# Patient Record
Sex: FEMALE | Race: WHITE
[De-identification: names, ages, dates, MRNs, and addresses within clinical notes are randomized per-mention and may not be internally consistent; named-entity substitution may affect disease eponyms.]

---

## 2018-04-03 ENCOUNTER — HOSPITAL ENCOUNTER (OUTPATIENT)
Dept: HOSPITAL 53 - M SMT | Age: 48
End: 2018-04-03
Attending: PHYSICIAN ASSISTANT
Payer: COMMERCIAL

## 2018-04-03 DIAGNOSIS — R60.0: Primary | ICD-10-CM

## 2018-04-03 DIAGNOSIS — N18.3: ICD-10-CM

## 2018-04-03 DIAGNOSIS — M10.072: ICD-10-CM

## 2018-04-03 LAB
ALBUMIN/GLOBULIN RATIO: 0.95 (ref 1–1.93)
ALBUMIN: 3.6 GM/DL (ref 3.2–5.2)
ALKALINE PHOSPHATASE: 113 U/L (ref 45–117)
ALT SERPL W P-5'-P-CCNC: 41 U/L (ref 12–78)
ANION GAP: 5 MEQ/L (ref 8–16)
AST SERPL-CCNC: 28 U/L (ref 7–37)
BILIRUBIN,TOTAL: 0.2 MG/DL (ref 0.2–1)
BLOOD UREA NITROGEN: 11 MG/DL (ref 7–18)
CALCIUM LEVEL: 8.6 MG/DL (ref 8.5–10.1)
CARBON DIOXIDE LEVEL: 30 MEQ/L (ref 21–32)
CHLORIDE LEVEL: 107 MEQ/L (ref 98–107)
CREATININE FOR GFR: 1.16 MG/DL (ref 0.55–1.3)
GFR SERPL CREATININE-BSD FRML MDRD: 53.3 ML/MIN/{1.73_M2} (ref 58–?)
GLUCOSE, FASTING: 85 MG/DL (ref 70–100)
NT-PRO BNP: 234 PG/ML (ref ?–125)
POTASSIUM SERUM: 4.2 MEQ/L (ref 3.5–5.1)
SODIUM LEVEL: 142 MEQ/L (ref 136–145)
TOTAL PROTEIN: 7.4 GM/DL (ref 6.4–8.2)

## 2018-04-03 PROCEDURE — 84550 ASSAY OF BLOOD/URIC ACID: CPT

## 2018-04-04 LAB — URIC ACID: 7.7 MG/DL (ref 2.6–6)

## 2018-04-12 ENCOUNTER — HOSPITAL ENCOUNTER (OUTPATIENT)
Dept: HOSPITAL 53 - M LAB REF | Age: 48
End: 2018-04-12
Attending: INTERNAL MEDICINE
Payer: COMMERCIAL

## 2018-04-12 DIAGNOSIS — D63.1: ICD-10-CM

## 2018-04-12 DIAGNOSIS — N18.3: Primary | ICD-10-CM

## 2018-04-12 PROCEDURE — 83550 IRON BINDING TEST: CPT

## 2018-04-13 LAB
FERRITIN: 26 NG/ML (ref 8–252)
IRON (FE): 52 UG/DL (ref 50–170)
IRON SATN MFR SERPL: 14.1 % (ref 13.2–45)
TOTAL IRON BINDING CAPACITY: 369 UG/DL (ref 250–450)

## 2018-04-19 ENCOUNTER — HOSPITAL ENCOUNTER (OUTPATIENT)
Dept: HOSPITAL 53 - M RAD | Age: 48
End: 2018-04-19
Attending: INTERNAL MEDICINE
Payer: COMMERCIAL

## 2018-04-19 DIAGNOSIS — R93.422: ICD-10-CM

## 2018-04-19 DIAGNOSIS — I10: Primary | ICD-10-CM

## 2018-04-19 DIAGNOSIS — R93.421: ICD-10-CM

## 2018-04-19 DIAGNOSIS — N18.3: Primary | ICD-10-CM

## 2018-04-19 PROCEDURE — 71046 X-RAY EXAM CHEST 2 VIEWS: CPT

## 2018-04-19 PROCEDURE — 76775 US EXAM ABDO BACK WALL LIM: CPT

## 2018-07-18 ENCOUNTER — HOSPITAL ENCOUNTER (OUTPATIENT)
Dept: HOSPITAL 53 - M SMT | Age: 48
End: 2018-07-18
Attending: FAMILY MEDICINE
Payer: COMMERCIAL

## 2018-07-18 DIAGNOSIS — N18.3: Primary | ICD-10-CM

## 2018-07-18 LAB
ALBUMIN/GLOBULIN RATIO: 1 (ref 1–1.93)
ALBUMIN: 3.6 GM/DL (ref 3.2–5.2)
ALKALINE PHOSPHATASE: 113 U/L (ref 45–117)
ALT SERPL W P-5'-P-CCNC: 44 U/L (ref 12–78)
ANION GAP: 8 MEQ/L (ref 8–16)
AST SERPL-CCNC: 29 U/L (ref 7–37)
BILIRUBIN,TOTAL: 0.3 MG/DL (ref 0.2–1)
BLOOD UREA NITROGEN: 21 MG/DL (ref 7–18)
CALCIUM LEVEL: 8.8 MG/DL (ref 8.5–10.1)
CARBON DIOXIDE LEVEL: 27 MEQ/L (ref 21–32)
CHLORIDE LEVEL: 107 MEQ/L (ref 98–107)
CREATININE FOR GFR: 1.37 MG/DL (ref 0.55–1.3)
EST. AVERAGE GLUCOSE BLD GHB EST-MCNC: 146 MG/DL (ref 60–110)
GFR SERPL CREATININE-BSD FRML MDRD: 44 ML/MIN/{1.73_M2} (ref 58–?)
GLUCOSE, FASTING: 102 MG/DL (ref 70–100)
POTASSIUM SERUM: 5.2 MEQ/L (ref 3.5–5.1)
SODIUM LEVEL: 142 MEQ/L (ref 136–145)
TOTAL PROTEIN: 7.2 GM/DL (ref 6.4–8.2)

## 2018-10-09 ENCOUNTER — HOSPITAL ENCOUNTER (OUTPATIENT)
Dept: HOSPITAL 53 - M SMT | Age: 48
End: 2018-10-09
Attending: FAMILY MEDICINE
Payer: COMMERCIAL

## 2018-10-09 DIAGNOSIS — E11.22: ICD-10-CM

## 2018-10-09 DIAGNOSIS — I12.9: Primary | ICD-10-CM

## 2018-10-09 LAB
ALBUMIN/GLOBULIN RATIO: 1 (ref 1–1.93)
ALBUMIN: 3.8 GM/DL (ref 3.2–5.2)
ALKALINE PHOSPHATASE: 122 U/L (ref 45–117)
ALT SERPL W P-5'-P-CCNC: 41 U/L (ref 12–78)
ANION GAP: 6 MEQ/L (ref 8–16)
AST SERPL-CCNC: 27 U/L (ref 7–37)
BILIRUBIN,TOTAL: 0.4 MG/DL (ref 0.2–1)
BLOOD UREA NITROGEN: 16 MG/DL (ref 7–18)
CALCIUM LEVEL: 8.9 MG/DL (ref 8.5–10.1)
CARBON DIOXIDE LEVEL: 29 MEQ/L (ref 21–32)
CHLORIDE LEVEL: 103 MEQ/L (ref 98–107)
CREATININE FOR GFR: 1.35 MG/DL (ref 0.55–1.3)
EST. AVERAGE GLUCOSE BLD GHB EST-MCNC: 137 MG/DL (ref 60–110)
GFR SERPL CREATININE-BSD FRML MDRD: 44.6 ML/MIN/{1.73_M2} (ref 58–?)
GLUCOSE, FASTING: 89 MG/DL (ref 70–100)
POTASSIUM SERUM: 5 MEQ/L (ref 3.5–5.1)
SODIUM LEVEL: 138 MEQ/L (ref 136–145)
TOTAL PROTEIN: 7.6 GM/DL (ref 6.4–8.2)

## 2018-10-09 PROCEDURE — 80053 COMPREHEN METABOLIC PANEL: CPT

## 2019-01-16 ENCOUNTER — HOSPITAL ENCOUNTER (OUTPATIENT)
Dept: HOSPITAL 53 - M SMT | Age: 49
End: 2019-01-16
Attending: PHYSICIAN ASSISTANT
Payer: COMMERCIAL

## 2019-01-16 DIAGNOSIS — Z79.899: ICD-10-CM

## 2019-01-16 DIAGNOSIS — E11.22: ICD-10-CM

## 2019-01-16 DIAGNOSIS — E55.9: Primary | ICD-10-CM

## 2019-01-16 LAB
25(OH)D3 SERPL-MCNC: 54.7 NG/ML (ref 30–100)
BUN SERPL-MCNC: 17 MG/DL (ref 7–18)
CALCIUM SERPL-MCNC: 9.3 MG/DL (ref 8.5–10.1)
CHLORIDE SERPL-SCNC: 103 MEQ/L (ref 98–107)
CO2 SERPL-SCNC: 28 MEQ/L (ref 21–32)
CREAT SERPL-MCNC: 1.46 MG/DL (ref 0.55–1.3)
EST. AVERAGE GLUCOSE BLD GHB EST-MCNC: 134 MG/DL (ref 60–110)
GFR SERPL CREATININE-BSD FRML MDRD: 40.7 ML/MIN/{1.73_M2} (ref 58–?)
GLUCOSE SERPL-MCNC: 86 MG/DL (ref 70–100)
POTASSIUM SERPL-SCNC: 4.8 MEQ/L (ref 3.5–5.1)
SODIUM SERPL-SCNC: 138 MEQ/L (ref 136–145)

## 2019-04-16 ENCOUNTER — HOSPITAL ENCOUNTER (OUTPATIENT)
Dept: HOSPITAL 53 - M SMT | Age: 49
End: 2019-04-16
Attending: FAMILY MEDICINE
Payer: COMMERCIAL

## 2019-04-16 DIAGNOSIS — E11.22: ICD-10-CM

## 2019-04-16 DIAGNOSIS — I12.9: Primary | ICD-10-CM

## 2019-04-16 LAB
ALBUMIN SERPL BCG-MCNC: 4 GM/DL (ref 3.2–5.2)
ALT SERPL W P-5'-P-CCNC: 28 U/L (ref 12–78)
BASOPHILS # BLD AUTO: 0 10^3/UL (ref 0–0.2)
BASOPHILS NFR BLD AUTO: 0.3 % (ref 0–1)
BILIRUB SERPL-MCNC: 0.3 MG/DL (ref 0.2–1)
BUN SERPL-MCNC: 15 MG/DL (ref 7–18)
CALCIUM SERPL-MCNC: 9.2 MG/DL (ref 8.5–10.1)
CHLORIDE SERPL-SCNC: 104 MEQ/L (ref 98–107)
CO2 SERPL-SCNC: 29 MEQ/L (ref 21–32)
CREAT SERPL-MCNC: 1.27 MG/DL (ref 0.55–1.3)
EOSINOPHIL # BLD AUTO: 0.1 10^3/UL (ref 0–0.5)
EOSINOPHIL NFR BLD AUTO: 1.3 % (ref 0–3)
EST. AVERAGE GLUCOSE BLD GHB EST-MCNC: 123 MG/DL (ref 60–110)
GFR SERPL CREATININE-BSD FRML MDRD: 47.8 ML/MIN/{1.73_M2} (ref 58–?)
GLUCOSE SERPL-MCNC: 95 MG/DL (ref 70–100)
HCT VFR BLD AUTO: 38.7 % (ref 36–47)
HGB BLD-MCNC: 12.3 G/DL (ref 12–15.5)
LYMPHOCYTES # BLD AUTO: 1.6 10^3/UL (ref 1.5–4.5)
LYMPHOCYTES NFR BLD AUTO: 20.1 % (ref 24–44)
MCH RBC QN AUTO: 28.3 PG (ref 27–33)
MCHC RBC AUTO-ENTMCNC: 31.8 G/DL (ref 32–36.5)
MCV RBC AUTO: 89.2 FL (ref 80–96)
MONOCYTES # BLD AUTO: 0.6 10^3/UL (ref 0–0.8)
MONOCYTES NFR BLD AUTO: 7 % (ref 0–5)
NEUTROPHILS # BLD AUTO: 5.6 10^3/UL (ref 1.8–7.7)
NEUTROPHILS NFR BLD AUTO: 70.8 % (ref 36–66)
PLATELET # BLD AUTO: 320 10^3/UL (ref 150–450)
POTASSIUM SERPL-SCNC: 4.3 MEQ/L (ref 3.5–5.1)
PROT SERPL-MCNC: 7.1 GM/DL (ref 6.4–8.2)
RBC # BLD AUTO: 4.34 10^6/UL (ref 4–5.4)
SODIUM SERPL-SCNC: 139 MEQ/L (ref 136–145)
WBC # BLD AUTO: 7.8 10^3/UL (ref 4–10)

## 2019-07-12 ENCOUNTER — HOSPITAL ENCOUNTER (OUTPATIENT)
Dept: HOSPITAL 53 - M SMT | Age: 49
End: 2019-07-12
Attending: PHYSICIAN ASSISTANT
Payer: COMMERCIAL

## 2019-07-12 DIAGNOSIS — E11.22: Primary | ICD-10-CM

## 2019-07-12 LAB
BUN SERPL-MCNC: 20 MG/DL (ref 7–18)
CALCIUM SERPL-MCNC: 8.3 MG/DL (ref 8.5–10.1)
CHLORIDE SERPL-SCNC: 108 MEQ/L (ref 98–107)
CO2 SERPL-SCNC: 25 MEQ/L (ref 21–32)
CREAT SERPL-MCNC: 1.21 MG/DL (ref 0.55–1.3)
EST. AVERAGE GLUCOSE BLD GHB EST-MCNC: 146 MG/DL (ref 60–110)
GFR SERPL CREATININE-BSD FRML MDRD: 50.6 ML/MIN/{1.73_M2} (ref 58–?)
GLUCOSE SERPL-MCNC: 79 MG/DL (ref 70–100)
POTASSIUM SERPL-SCNC: 4.2 MEQ/L (ref 3.5–5.1)
SODIUM SERPL-SCNC: 141 MEQ/L (ref 136–145)

## 2020-01-24 ENCOUNTER — HOSPITAL ENCOUNTER (OUTPATIENT)
Dept: HOSPITAL 53 - M LABDRAW1 | Age: 50
End: 2020-01-24
Attending: FAMILY MEDICINE
Payer: COMMERCIAL

## 2020-01-24 DIAGNOSIS — E11.22: Primary | ICD-10-CM

## 2020-01-24 LAB
ALBUMIN SERPL BCG-MCNC: 3.9 GM/DL (ref 3.2–5.2)
ALT SERPL W P-5'-P-CCNC: 20 U/L (ref 12–78)
BILIRUB SERPL-MCNC: 0.6 MG/DL (ref 0.2–1)
BUN SERPL-MCNC: 13 MG/DL (ref 7–18)
CALCIUM SERPL-MCNC: 9.1 MG/DL (ref 8.5–10.1)
CHLORIDE SERPL-SCNC: 101 MEQ/L (ref 98–107)
CO2 SERPL-SCNC: 32 MEQ/L (ref 21–32)
CREAT SERPL-MCNC: 1.4 MG/DL (ref 0.55–1.3)
EST. AVERAGE GLUCOSE BLD GHB EST-MCNC: 117 MG/DL (ref 60–110)
GFR SERPL CREATININE-BSD FRML MDRD: 42.5 ML/MIN/{1.73_M2} (ref 58–?)
GLUCOSE SERPL-MCNC: 84 MG/DL (ref 70–100)
POTASSIUM SERPL-SCNC: 4.4 MEQ/L (ref 3.5–5.1)
PROT SERPL-MCNC: 7.5 GM/DL (ref 6.4–8.2)
SODIUM SERPL-SCNC: 140 MEQ/L (ref 136–145)

## 2020-07-23 ENCOUNTER — HOSPITAL ENCOUNTER (OUTPATIENT)
Dept: HOSPITAL 53 - M LRY | Age: 50
End: 2020-07-23
Attending: PHYSICIAN ASSISTANT
Payer: COMMERCIAL

## 2020-07-23 DIAGNOSIS — E11.22: Primary | ICD-10-CM

## 2020-07-23 DIAGNOSIS — E55.9: ICD-10-CM

## 2020-10-16 ENCOUNTER — HOSPITAL ENCOUNTER (OUTPATIENT)
Dept: HOSPITAL 53 - M LAB REF | Age: 50
End: 2020-10-16
Attending: FAMILY MEDICINE
Payer: COMMERCIAL

## 2020-10-16 DIAGNOSIS — I12.9: Primary | ICD-10-CM

## 2020-10-16 LAB
BASOPHILS # BLD AUTO: 0 10^3/UL (ref 0–0.2)
BASOPHILS NFR BLD AUTO: 0.4 % (ref 0–1)
BUN SERPL-MCNC: 15 MG/DL (ref 7–18)
CALCIUM SERPL-MCNC: 9.2 MG/DL (ref 8.5–10.1)
CHLORIDE SERPL-SCNC: 104 MEQ/L (ref 98–107)
CO2 SERPL-SCNC: 31 MEQ/L (ref 21–32)
CREAT SERPL-MCNC: 1.32 MG/DL (ref 0.55–1.3)
EOSINOPHIL # BLD AUTO: 0.1 10^3/UL (ref 0–0.5)
EOSINOPHIL NFR BLD AUTO: 1.2 % (ref 0–3)
EST. AVERAGE GLUCOSE BLD GHB EST-MCNC: 120 MG/DL (ref 60–110)
GFR SERPL CREATININE-BSD FRML MDRD: 45.4 ML/MIN/{1.73_M2} (ref 51–?)
GLUCOSE SERPL-MCNC: 83 MG/DL (ref 70–100)
HCT VFR BLD AUTO: 39.7 % (ref 36–47)
HGB BLD-MCNC: 12.6 G/DL (ref 12–15.5)
LYMPHOCYTES # BLD AUTO: 1.8 10^3/UL (ref 1.5–5)
LYMPHOCYTES NFR BLD AUTO: 23 % (ref 24–44)
MCH RBC QN AUTO: 28.2 PG (ref 27–33)
MCHC RBC AUTO-ENTMCNC: 31.7 G/DL (ref 32–36.5)
MCV RBC AUTO: 88.8 FL (ref 80–96)
MONOCYTES # BLD AUTO: 0.6 10^3/UL (ref 0–0.8)
MONOCYTES NFR BLD AUTO: 8 % (ref 0–5)
NEUTROPHILS # BLD AUTO: 5.2 10^3/UL (ref 1.5–8.5)
NEUTROPHILS NFR BLD AUTO: 67 % (ref 36–66)
PLATELET # BLD AUTO: 296 10^3/UL (ref 150–450)
POTASSIUM SERPL-SCNC: 4.6 MEQ/L (ref 3.5–5.1)
RBC # BLD AUTO: 4.47 10^6/UL (ref 4–5.4)
SODIUM SERPL-SCNC: 138 MEQ/L (ref 136–145)
WBC # BLD AUTO: 7.7 10^3/UL (ref 4–10)

## 2020-10-28 ENCOUNTER — HOSPITAL ENCOUNTER (OUTPATIENT)
Dept: HOSPITAL 53 - M RAD | Age: 50
End: 2020-10-28
Attending: INTERNAL MEDICINE
Payer: COMMERCIAL

## 2020-10-28 DIAGNOSIS — M18.32: Primary | ICD-10-CM

## 2020-10-29 NOTE — REP
INDICATION:

CKD III



COMPARISON:

12/23/2019



TECHNIQUE:

Real time gray scale ultrasound examination using curved array transducer.



FINDINGS:

The bilateral kidneys are relatively normal in reniform shape and demonstrate

increased central sinus fat along with echogenic intrarenal vasculature consistent

with age related medical renal disease.



Right kidney measures 9.8 x 4.2 x 4.1 cm without hydronephrosis, cystic or renal mass

lesion.



Left kidney measures 11.1 x 3.9 x 4.7 cm without hydronephrosis, cystic or renal mass

lesion.



Bladder is under unremarkable.



IMPRESSION:

1. Findings consistent with chronic medical renal disease as described above.







<Electronically signed by Arya Sahni > 10/29/20 0946

## 2020-12-04 ENCOUNTER — HOSPITAL ENCOUNTER (OUTPATIENT)
Dept: HOSPITAL 53 - M LABSMTC | Age: 50
End: 2020-12-04
Attending: ANESTHESIOLOGY
Payer: COMMERCIAL

## 2020-12-04 DIAGNOSIS — Z01.812: Primary | ICD-10-CM

## 2020-12-04 DIAGNOSIS — Z20.828: ICD-10-CM

## 2020-12-09 ENCOUNTER — HOSPITAL ENCOUNTER (OUTPATIENT)
Dept: HOSPITAL 53 - M OPP | Age: 50
Discharge: HOME | End: 2020-12-09
Attending: SURGERY
Payer: COMMERCIAL

## 2020-12-09 VITALS — WEIGHT: 218 LBS | HEIGHT: 66 IN | BODY MASS INDEX: 35.03 KG/M2

## 2020-12-09 VITALS — DIASTOLIC BLOOD PRESSURE: 76 MMHG | SYSTOLIC BLOOD PRESSURE: 125 MMHG

## 2020-12-09 DIAGNOSIS — I10: ICD-10-CM

## 2020-12-09 DIAGNOSIS — Z79.899: ICD-10-CM

## 2020-12-09 DIAGNOSIS — K64.0: ICD-10-CM

## 2020-12-09 DIAGNOSIS — Z79.84: ICD-10-CM

## 2020-12-09 DIAGNOSIS — G47.30: ICD-10-CM

## 2020-12-09 DIAGNOSIS — E11.9: ICD-10-CM

## 2020-12-09 DIAGNOSIS — Z12.11: Primary | ICD-10-CM

## 2020-12-09 DIAGNOSIS — Z88.1: ICD-10-CM

## 2020-12-09 DIAGNOSIS — K63.89: ICD-10-CM

## 2020-12-09 NOTE — ROOR
________________________________________________________________________________

Patient Name: Steven Day                Procedure Date: 12/9/2020 8:47 AM

MRN: [id number]                          Account Number: [id number]

YOB: 1970               Age: 50

Room: [facility]                            Gender: Female

Note Status: Finalized                 

________________________________________________________________________________

 

Procedure:            Colonoscopy

Indications:          Screening for colorectal malignant neoplasm

Providers:            Pepe Matta DO

Referring MD:         Ifeoma ORTIZ DO

Requesting Provider:  

Medicines:            Propofol per Anesthesia

Complications:        No immediate complications.

________________________________________________________________________________

Procedure:            Pre-Anesthesia Assessment:

                      - Prior to the procedure, a History and Physical was 

                      performed, and patient medications and allergies were 

                      reviewed. The patient is competent. The risks and 

                      benefits of the procedure and the sedation options and 

                      risks were discussed with the patient. All questions 

                      were answered and informed consent was obtained. Patient 

                      identification and proposed procedure were verified by 

                      the physician, the nurse, the anesthetist and the 

                      technician in the endoscopy suite. Mental Status 

                      Examination: alert and oriented. Airway Examination: 

                      normal oropharyngeal airway and neck mobility. 

                      Respiratory Examination: clear to auscultation. CV 

                      Examination: normal. Prophylactic Antibiotics: The 

                      patient does not require prophylactic antibiotics. Prior 

                      Anticoagulants: The patient has taken no previous 

                      anticoagulant or antiplatelet agents. ASA Grade 

                      Assessment: II - A patient with mild systemic disease. 

                      After reviewing the risks and benefits, the patient was 

                      deemed in satisfactory condition to undergo the 

                      procedure. The anesthesia plan was to use monitored 

                      anesthesia care (MAC). Immediately prior to 

                      administration of medications, the patient was 

                      re-assessed for adequacy to receive sedatives. The heart 

                      rate, respiratory rate, oxygen saturations, blood 

                      pressure, adequacy of pulmonary ventilation, and 

                      response to care were monitored throughout the 

                      procedure. The physical status of the patient was 

                      re-assessed after the procedure.

                      The Colonoscope was introduced through the anus and 

                      advanced to the cecum, identified by appendiceal orifice 

                      and ileocecal valve. The colonoscopy was performed 

                      without difficulty. The patient tolerated the procedure 

                      well.

                                                                                

Findings:

     Non-bleeding internal hemorrhoids were found during retroflexion. The 

     hemorrhoids were Grade I (internal hemorrhoids that do not prolapse).

     A moderate amount of liquid stool was found in the entire colon, making 

     visualization difficult.

     Multiple petechiae were found in the rectum. Biopsies were taken with a 

     cold forceps for histology. Estimated blood loss was minimal.

                                                                                

Impression:           - Non-bleeding internal hemorrhoids.

                      - Stool in the entire examined colon.

                      - Petechia(e) in the rectum. Biopsied.

Recommendation:       - Patient has a contact number available for 

                      emergencies. The signs and symptoms of potential delayed 

                      complications were discussed with the patient. Return to 

                      normal activities tomorrow. Written discharge 

                      instructions were provided to the patient.

                      - Repeat colonoscopy in 3 - 5 years for surveillance 

                      based on pathology results.

                      - Return to my office at appointment to be scheduled.

                      - Await pathology results.

                                                                                

Procedure Code(s):    --- Professional ---

                      39061, Colonoscopy, flexible; with biopsy, single or 

                      multiple

Diagnosis Code(s):    --- Professional ---

                      Z12.11, Encounter for screening for malignant neoplasm 

                      of colon

                      K64.0, First degree hemorrhoids

                      K63.89, Other specified diseases of intestine

 

CPT copyright 2019 [employer]. All rights reserved.

 

The codes documented in this report are preliminary and upon  review may 

be revised to meet current compliance requirements.

 

_________________

Peep Matta DO

12/9/2020 9:11:31 AM

Electronically signed by Pepe Matta DO

Number of Addenda: 0

 

Note Initiated On: 12/9/2020 8:47 AM

Estimated Blood Loss: Estimated blood loss was minimal.

## 2021-01-22 ENCOUNTER — HOSPITAL ENCOUNTER (OUTPATIENT)
Dept: HOSPITAL 53 - M LAB | Age: 51
End: 2021-01-22
Attending: PHYSICIAN ASSISTANT
Payer: COMMERCIAL

## 2021-01-22 DIAGNOSIS — E11.22: ICD-10-CM

## 2021-01-22 DIAGNOSIS — E55.9: ICD-10-CM

## 2021-01-22 DIAGNOSIS — I12.9: Primary | ICD-10-CM

## 2021-01-22 DIAGNOSIS — F41.1: ICD-10-CM

## 2021-01-22 LAB
25(OH)D3 SERPL-MCNC: 41.4 NG/ML (ref 30–100)
ALBUMIN SERPL BCG-MCNC: 3.8 GM/DL (ref 3.2–5.2)
ALT SERPL W P-5'-P-CCNC: 19 U/L (ref 12–78)
BILIRUB SERPL-MCNC: 0.4 MG/DL (ref 0.2–1)
BUN SERPL-MCNC: 17 MG/DL (ref 7–18)
CALCIUM SERPL-MCNC: 9.4 MG/DL (ref 8.5–10.1)
CHLORIDE SERPL-SCNC: 104 MEQ/L (ref 98–107)
CHOLEST SERPL-MCNC: 203 MG/DL (ref ?–200)
CHOLEST/HDLC SERPL: 3.38 {RATIO} (ref ?–5)
CO2 SERPL-SCNC: 32 MEQ/L (ref 21–32)
CREAT SERPL-MCNC: 1.43 MG/DL (ref 0.55–1.3)
EST. AVERAGE GLUCOSE BLD GHB EST-MCNC: 123 MG/DL (ref 60–110)
GFR SERPL CREATININE-BSD FRML MDRD: 41.3 ML/MIN/{1.73_M2} (ref 51–?)
GLUCOSE SERPL-MCNC: 84 MG/DL (ref 70–100)
HDLC SERPL-MCNC: 60 MG/DL (ref 40–?)
LDLC SERPL CALC-MCNC: 126 MG/DL (ref ?–100)
NONHDLC SERPL-MCNC: 143 MG/DL
POTASSIUM SERPL-SCNC: 4.7 MEQ/L (ref 3.5–5.1)
PROT SERPL-MCNC: 7.2 GM/DL (ref 6.4–8.2)
SODIUM SERPL-SCNC: 139 MEQ/L (ref 136–145)
T4 FREE SERPL-MCNC: 0.96 NG/DL (ref 0.76–1.46)
TRIGL SERPL-MCNC: 83 MG/DL (ref ?–150)
TSH SERPL DL<=0.005 MIU/L-ACNC: 2.42 UIU/ML (ref 0.36–3.74)

## 2021-04-19 ENCOUNTER — HOSPITAL ENCOUNTER (OUTPATIENT)
Dept: HOSPITAL 53 - M LAB REF | Age: 51
End: 2021-04-19
Attending: FAMILY MEDICINE
Payer: COMMERCIAL

## 2021-04-19 DIAGNOSIS — E11.22: Primary | ICD-10-CM

## 2021-04-19 LAB
ALBUMIN SERPL BCG-MCNC: 4 GM/DL (ref 3.2–5.2)
ALT SERPL W P-5'-P-CCNC: 24 U/L (ref 12–78)
BILIRUB SERPL-MCNC: 0.4 MG/DL (ref 0.2–1)
BUN SERPL-MCNC: 17 MG/DL (ref 7–18)
CALCIUM SERPL-MCNC: 9.4 MG/DL (ref 8.5–10.1)
CHLORIDE SERPL-SCNC: 103 MEQ/L (ref 98–107)
CHOLEST SERPL-MCNC: 158 MG/DL (ref ?–200)
CHOLEST/HDLC SERPL: 2.43 {RATIO} (ref ?–5)
CO2 SERPL-SCNC: 31 MEQ/L (ref 21–32)
CREAT SERPL-MCNC: 1.23 MG/DL (ref 0.55–1.3)
EST. AVERAGE GLUCOSE BLD GHB EST-MCNC: 120 MG/DL (ref 60–110)
GFR SERPL CREATININE-BSD FRML MDRD: 49.2 ML/MIN/{1.73_M2} (ref 51–?)
GLUCOSE SERPL-MCNC: 86 MG/DL (ref 70–100)
HDLC SERPL-MCNC: 65 MG/DL (ref 40–?)
LDLC SERPL CALC-MCNC: 79 MG/DL (ref ?–100)
NONHDLC SERPL-MCNC: 93 MG/DL
POTASSIUM SERPL-SCNC: 4.4 MEQ/L (ref 3.5–5.1)
PROT SERPL-MCNC: 7.4 GM/DL (ref 6.4–8.2)
SODIUM SERPL-SCNC: 139 MEQ/L (ref 136–145)
TRIGL SERPL-MCNC: 68 MG/DL (ref ?–150)

## 2021-07-26 ENCOUNTER — HOSPITAL ENCOUNTER (OUTPATIENT)
Dept: HOSPITAL 53 - M PLALAB | Age: 51
End: 2021-07-26
Attending: FAMILY MEDICINE
Payer: COMMERCIAL

## 2021-07-26 DIAGNOSIS — E55.9: Primary | ICD-10-CM

## 2021-07-26 LAB
25(OH)D3 SERPL-MCNC: 37.7 NG/ML (ref 30–100)
BUN SERPL-MCNC: 18 MG/DL (ref 7–18)
CALCIUM SERPL-MCNC: 9.1 MG/DL (ref 8.5–10.1)
CHLORIDE SERPL-SCNC: 106 MEQ/L (ref 98–107)
CO2 SERPL-SCNC: 30 MEQ/L (ref 21–32)
CREAT SERPL-MCNC: 1.23 MG/DL (ref 0.55–1.3)
EST. AVERAGE GLUCOSE BLD GHB EST-MCNC: 123 MG/DL (ref 60–110)
GFR SERPL CREATININE-BSD FRML MDRD: 49.2 ML/MIN/{1.73_M2} (ref 51–?)
GLUCOSE SERPL-MCNC: 84 MG/DL (ref 70–100)
POTASSIUM SERPL-SCNC: 4.5 MEQ/L (ref 3.5–5.1)
SODIUM SERPL-SCNC: 141 MEQ/L (ref 136–145)

## 2021-10-25 ENCOUNTER — HOSPITAL ENCOUNTER (OUTPATIENT)
Dept: HOSPITAL 53 - M LAB REF | Age: 51
End: 2021-10-25
Attending: FAMILY MEDICINE
Payer: COMMERCIAL

## 2021-10-25 ENCOUNTER — HOSPITAL ENCOUNTER (OUTPATIENT)
Dept: HOSPITAL 53 - M LAB REF | Age: 51
End: 2021-10-25
Attending: INTERNAL MEDICINE
Payer: COMMERCIAL

## 2021-10-25 DIAGNOSIS — E83.42: Primary | ICD-10-CM

## 2021-10-25 DIAGNOSIS — N18.30: ICD-10-CM

## 2021-10-25 DIAGNOSIS — E11.22: Primary | ICD-10-CM

## 2021-10-25 LAB
ALBUMIN SERPL BCG-MCNC: 3.9 GM/DL (ref 3.2–5.2)
ALT SERPL W P-5'-P-CCNC: 24 U/L (ref 12–78)
BASOPHILS # BLD AUTO: 0 10^3/UL (ref 0–0.2)
BASOPHILS NFR BLD AUTO: 0.5 % (ref 0–1)
BILIRUB SERPL-MCNC: 0.4 MG/DL (ref 0.2–1)
BUN SERPL-MCNC: 19 MG/DL (ref 7–18)
CALCIUM SERPL-MCNC: 9.4 MG/DL (ref 8.5–10.1)
CHLORIDE SERPL-SCNC: 102 MEQ/L (ref 98–107)
CO2 SERPL-SCNC: 28 MEQ/L (ref 21–32)
CREAT SERPL-MCNC: 1.37 MG/DL (ref 0.55–1.3)
EOSINOPHIL # BLD AUTO: 0.1 10^3/UL (ref 0–0.5)
EOSINOPHIL NFR BLD AUTO: 1.6 % (ref 0–3)
EST. AVERAGE GLUCOSE BLD GHB EST-MCNC: 120 MG/DL (ref 60–110)
GFR SERPL CREATININE-BSD FRML MDRD: 43.3 ML/MIN/{1.73_M2} (ref 51–?)
GLUCOSE SERPL-MCNC: 78 MG/DL (ref 70–100)
HCT VFR BLD AUTO: 38.9 % (ref 36–47)
HGB BLD-MCNC: 12.7 G/DL (ref 12–15.5)
LYMPHOCYTES # BLD AUTO: 1.8 10^3/UL (ref 1.5–5)
LYMPHOCYTES NFR BLD AUTO: 22.6 % (ref 24–44)
MCH RBC QN AUTO: 28.7 PG (ref 27–33)
MCHC RBC AUTO-ENTMCNC: 32.6 G/DL (ref 32–36.5)
MCV RBC AUTO: 87.8 FL (ref 80–96)
MONOCYTES # BLD AUTO: 0.6 10^3/UL (ref 0–0.8)
MONOCYTES NFR BLD AUTO: 7.7 % (ref 2–8)
NEUTROPHILS # BLD AUTO: 5.5 10^3/UL (ref 1.5–8.5)
NEUTROPHILS NFR BLD AUTO: 67.2 % (ref 36–66)
PLATELET # BLD AUTO: 298 10^3/UL (ref 150–450)
POTASSIUM SERPL-SCNC: 4.5 MEQ/L (ref 3.5–5.1)
PROT SERPL-MCNC: 7.5 GM/DL (ref 6.4–8.2)
RBC # BLD AUTO: 4.43 10^6/UL (ref 4–5.4)
SODIUM SERPL-SCNC: 137 MEQ/L (ref 136–145)
WBC # BLD AUTO: 8.1 10^3/UL (ref 4–10)

## 2022-01-29 ENCOUNTER — HOSPITAL ENCOUNTER (OUTPATIENT)
Dept: HOSPITAL 53 - M LAB | Age: 52
End: 2022-01-29
Attending: PHYSICIAN ASSISTANT
Payer: COMMERCIAL

## 2022-01-29 DIAGNOSIS — E11.22: Primary | ICD-10-CM

## 2022-01-29 LAB
ALBUMIN SERPL BCG-MCNC: 3.5 GM/DL (ref 3.2–5.2)
ALT SERPL W P-5'-P-CCNC: 24 U/L (ref 12–78)
BILIRUB SERPL-MCNC: 0.4 MG/DL (ref 0.2–1)
BUN SERPL-MCNC: 12 MG/DL (ref 7–18)
CALCIUM SERPL-MCNC: 8.6 MG/DL (ref 8.5–10.1)
CHLORIDE SERPL-SCNC: 108 MEQ/L (ref 98–107)
CO2 SERPL-SCNC: 30 MEQ/L (ref 21–32)
CREAT SERPL-MCNC: 1.31 MG/DL (ref 0.55–1.3)
EST. AVERAGE GLUCOSE BLD GHB EST-MCNC: 114 MG/DL (ref 60–110)
GFR SERPL CREATININE-BSD FRML MDRD: 45.6 ML/MIN/{1.73_M2} (ref 51–?)
GLUCOSE SERPL-MCNC: 85 MG/DL (ref 70–100)
POTASSIUM SERPL-SCNC: 4.4 MEQ/L (ref 3.5–5.1)
PROT SERPL-MCNC: 7.1 GM/DL (ref 6.4–8.2)
SODIUM SERPL-SCNC: 144 MEQ/L (ref 136–145)
T4 FREE SERPL-MCNC: 1.08 NG/DL (ref 0.76–1.46)
TSH SERPL DL<=0.005 MIU/L-ACNC: 1.73 UIU/ML (ref 0.36–3.74)

## 2022-01-31 LAB — 25(OH)D3 SERPL-MCNC: 44.8 NG/ML (ref 30–100)

## 2022-03-21 ENCOUNTER — HOSPITAL ENCOUNTER (OUTPATIENT)
Dept: HOSPITAL 53 - M RAD | Age: 52
End: 2022-03-21
Attending: FAMILY MEDICINE
Payer: COMMERCIAL

## 2022-03-21 DIAGNOSIS — M54.42: Primary | ICD-10-CM

## 2022-06-09 ENCOUNTER — HOSPITAL ENCOUNTER (OUTPATIENT)
Dept: HOSPITAL 53 - M WUC | Age: 52
End: 2022-06-09
Attending: FAMILY MEDICINE
Payer: COMMERCIAL

## 2022-06-09 DIAGNOSIS — I12.9: Primary | ICD-10-CM

## 2022-06-09 DIAGNOSIS — Z01.812: ICD-10-CM

## 2022-06-09 LAB
BUN SERPL-MCNC: 13 MG/DL (ref 7–18)
CALCIUM SERPL-MCNC: 8.6 MG/DL (ref 8.5–10.1)
CHLORIDE SERPL-SCNC: 103 MEQ/L (ref 98–107)
CO2 SERPL-SCNC: 32 MEQ/L (ref 21–32)
CREAT SERPL-MCNC: 1.22 MG/DL (ref 0.55–1.3)
GFR SERPL CREATININE-BSD FRML MDRD: 49.5 ML/MIN/{1.73_M2} (ref 51–?)
GLUCOSE SERPL-MCNC: 85 MG/DL (ref 70–100)
POTASSIUM SERPL-SCNC: 4 MEQ/L (ref 3.5–5.1)
SODIUM SERPL-SCNC: 138 MEQ/L (ref 136–145)

## 2022-08-06 ENCOUNTER — HOSPITAL ENCOUNTER (OUTPATIENT)
Dept: HOSPITAL 53 - M LAB | Age: 52
End: 2022-08-06
Attending: NURSE PRACTITIONER
Payer: COMMERCIAL

## 2022-08-06 DIAGNOSIS — E11.22: Primary | ICD-10-CM

## 2022-08-06 LAB
ALBUMIN SERPL BCG-MCNC: 3.6 GM/DL (ref 3.2–5.2)
ALT SERPL W P-5'-P-CCNC: 28 U/L (ref 12–78)
BASOPHILS # BLD AUTO: 0 10^3/UL (ref 0–0.2)
BASOPHILS NFR BLD AUTO: 0.5 % (ref 0–1)
BILIRUB SERPL-MCNC: 0.4 MG/DL (ref 0.2–1)
BUN SERPL-MCNC: 13 MG/DL (ref 7–18)
CALCIUM SERPL-MCNC: 9 MG/DL (ref 8.5–10.1)
CHLORIDE SERPL-SCNC: 103 MEQ/L (ref 98–107)
CHOLEST SERPL-MCNC: 160 MG/DL (ref ?–200)
CHOLEST/HDLC SERPL: 2.58 {RATIO} (ref ?–5)
CO2 SERPL-SCNC: 31 MEQ/L (ref 21–32)
CREAT SERPL-MCNC: 1.15 MG/DL (ref 0.55–1.3)
EOSINOPHIL # BLD AUTO: 0.1 10^3/UL (ref 0–0.5)
EOSINOPHIL NFR BLD AUTO: 2.1 % (ref 0–3)
EST. AVERAGE GLUCOSE BLD GHB EST-MCNC: 117 MG/DL (ref 60–110)
GFR SERPL CREATININE-BSD FRML MDRD: 53 ML/MIN/{1.73_M2} (ref 51–?)
GLUCOSE SERPL-MCNC: 88 MG/DL (ref 70–100)
HCT VFR BLD AUTO: 40.1 % (ref 36–47)
HDLC SERPL-MCNC: 62 MG/DL (ref 40–?)
HGB BLD-MCNC: 12.9 G/DL (ref 12–15.5)
LDLC SERPL CALC-MCNC: 80 MG/DL (ref ?–100)
LYMPHOCYTES # BLD AUTO: 1.8 10^3/UL (ref 1.5–5)
LYMPHOCYTES NFR BLD AUTO: 27.3 % (ref 24–44)
MCH RBC QN AUTO: 28.8 PG (ref 27–33)
MCHC RBC AUTO-ENTMCNC: 32.2 G/DL (ref 32–36.5)
MCV RBC AUTO: 89.5 FL (ref 80–96)
MONOCYTES # BLD AUTO: 0.5 10^3/UL (ref 0–0.8)
MONOCYTES NFR BLD AUTO: 7.7 % (ref 2–8)
NEUTROPHILS # BLD AUTO: 4.1 10^3/UL (ref 1.5–8.5)
NEUTROPHILS NFR BLD AUTO: 62.1 % (ref 36–66)
NONHDLC SERPL-MCNC: 98 MG/DL
PLATELET # BLD AUTO: 277 10^3/UL (ref 150–450)
POTASSIUM SERPL-SCNC: 4.3 MEQ/L (ref 3.5–5.1)
PROT SERPL-MCNC: 7.3 GM/DL (ref 6.4–8.2)
RBC # BLD AUTO: 4.48 10^6/UL (ref 4–5.4)
SODIUM SERPL-SCNC: 137 MEQ/L (ref 136–145)
T4 FREE SERPL-MCNC: 1.09 NG/DL (ref 0.76–1.46)
TRIGL SERPL-MCNC: 90 MG/DL (ref ?–150)
TSH SERPL DL<=0.005 MIU/L-ACNC: 1.93 UIU/ML (ref 0.36–3.74)
WBC # BLD AUTO: 6.6 10^3/UL (ref 4–10)

## 2022-08-08 LAB
25(OH)D3 SERPL-MCNC: 47 NG/ML (ref 30–100)
VIT B12 SERPL-MCNC: 654 PG/ML (ref 247–911)

## 2022-11-12 ENCOUNTER — HOSPITAL ENCOUNTER (OUTPATIENT)
Dept: HOSPITAL 53 - M LAB | Age: 52
End: 2022-11-12
Attending: FAMILY MEDICINE
Payer: COMMERCIAL

## 2022-11-12 DIAGNOSIS — E11.22: Primary | ICD-10-CM

## 2022-11-12 LAB
ALBUMIN SERPL BCG-MCNC: 3.7 GM/DL (ref 3.2–5.2)
ALT SERPL W P-5'-P-CCNC: 24 U/L (ref 12–78)
BILIRUB SERPL-MCNC: 0.4 MG/DL (ref 0.2–1)
BUN SERPL-MCNC: 14 MG/DL (ref 7–18)
CALCIUM SERPL-MCNC: 9.3 MG/DL (ref 8.5–10.1)
CHLORIDE SERPL-SCNC: 105 MEQ/L (ref 98–107)
CHOLEST SERPL-MCNC: 136 MG/DL (ref ?–200)
CHOLEST/HDLC SERPL: 2.06 {RATIO} (ref ?–5)
CO2 SERPL-SCNC: 30 MEQ/L (ref 21–32)
CREAT SERPL-MCNC: 1.25 MG/DL (ref 0.55–1.3)
EST. AVERAGE GLUCOSE BLD GHB EST-MCNC: 120 MG/DL (ref 60–110)
GFR SERPL CREATININE-BSD FRML MDRD: 47.9 ML/MIN/{1.73_M2} (ref 51–?)
GLUCOSE SERPL-MCNC: 95 MG/DL (ref 70–100)
HDLC SERPL-MCNC: 66 MG/DL (ref 40–?)
LDLC SERPL CALC-MCNC: 55 MG/DL (ref ?–100)
NONHDLC SERPL-MCNC: 70 MG/DL
POTASSIUM SERPL-SCNC: 4.2 MEQ/L (ref 3.5–5.1)
PROT SERPL-MCNC: 7.5 GM/DL (ref 6.4–8.2)
SODIUM SERPL-SCNC: 138 MEQ/L (ref 136–145)
TRIGL SERPL-MCNC: 73 MG/DL (ref ?–150)

## 2023-02-10 ENCOUNTER — HOSPITAL ENCOUNTER (OUTPATIENT)
Dept: HOSPITAL 53 - M LAB | Age: 53
End: 2023-02-10
Attending: NURSE PRACTITIONER
Payer: COMMERCIAL

## 2023-02-10 DIAGNOSIS — E11.22: Primary | ICD-10-CM

## 2023-02-10 LAB
25(OH)D3 SERPL-MCNC: 48.3 NG/ML (ref 20–100)
ALBUMIN SERPL BCG-MCNC: 3.9 G/DL (ref 3.2–5.2)
ALP SERPL-CCNC: 128 U/L (ref 46–116)
ALT SERPL W P-5'-P-CCNC: 26 U/L (ref 7–40)
AST SERPL-CCNC: 27 U/L (ref ?–34)
BASOPHILS # BLD AUTO: 0 10^3/UL (ref 0–0.2)
BASOPHILS NFR BLD AUTO: 0.4 % (ref 0–1)
BILIRUB SERPL-MCNC: 0.5 MG/DL (ref 0.3–1.2)
BUN SERPL-MCNC: 19 MG/DL (ref 9–23)
CALCIUM SERPL-MCNC: 9.2 MG/DL (ref 8.5–10.1)
CHLORIDE SERPL-SCNC: 101 MMOL/L (ref 98–107)
CO2 SERPL-SCNC: 31 MMOL/L (ref 20–31)
CREAT SERPL-MCNC: 1.13 MG/DL (ref 0.55–1.3)
CREAT UR-MCNC: 182.1 MG/DL
EOSINOPHIL # BLD AUTO: 0.1 10^3/UL (ref 0–0.5)
EOSINOPHIL NFR BLD AUTO: 1.2 % (ref 0–3)
EST. AVERAGE GLUCOSE BLD GHB EST-MCNC: 108 MG/DL (ref 60–110)
GFR SERPL CREATININE-BSD FRML MDRD: 53.8 ML/MIN/{1.73_M2} (ref 51–?)
GLUCOSE SERPL-MCNC: 91 MG/DL (ref 60–100)
HCT VFR BLD AUTO: 40.8 % (ref 36–47)
HGB BLD-MCNC: 13 G/DL (ref 12–15.5)
LYMPHOCYTES # BLD AUTO: 2 10^3/UL (ref 1.5–5)
LYMPHOCYTES NFR BLD AUTO: 27.5 % (ref 24–44)
MCH RBC QN AUTO: 28.8 PG (ref 27–33)
MCHC RBC AUTO-ENTMCNC: 31.9 G/DL (ref 32–36.5)
MCV RBC AUTO: 90.5 FL (ref 80–96)
MICROALBUMIN UR-MCNC: < 3 MG/DL
MICROALBUMIN/CREAT UR: 1.6 MCG/MG (ref 0–30)
MONOCYTES # BLD AUTO: 0.7 10^3/UL (ref 0–0.8)
MONOCYTES NFR BLD AUTO: 9 % (ref 2–8)
NEUTROPHILS # BLD AUTO: 4.5 10^3/UL (ref 1.5–8.5)
NEUTROPHILS NFR BLD AUTO: 61.2 % (ref 36–66)
PLATELET # BLD AUTO: 294 10^3/UL (ref 150–450)
POTASSIUM SERPL-SCNC: 4.6 MMOL/L (ref 3.5–5.1)
PROT SERPL-MCNC: 7.2 G/DL (ref 5.7–8.2)
RBC # BLD AUTO: 4.51 10^6/UL (ref 4–5.4)
SODIUM SERPL-SCNC: 138 MMOL/L (ref 136–145)
WBC # BLD AUTO: 7.3 10^3/UL (ref 4–10)

## 2023-02-24 ENCOUNTER — HOSPITAL ENCOUNTER (OUTPATIENT)
Dept: HOSPITAL 53 - M RAD | Age: 53
End: 2023-02-24
Attending: NURSE PRACTITIONER
Payer: COMMERCIAL

## 2023-02-24 DIAGNOSIS — R68.84: Primary | ICD-10-CM

## 2023-05-10 ENCOUNTER — HOSPITAL ENCOUNTER (OUTPATIENT)
Dept: HOSPITAL 53 - M LAB | Age: 53
End: 2023-05-10
Attending: NURSE PRACTITIONER
Payer: COMMERCIAL

## 2023-05-10 DIAGNOSIS — E67.3: Primary | ICD-10-CM

## 2023-05-10 DIAGNOSIS — E11.22: Primary | ICD-10-CM

## 2023-05-10 LAB
ALBUMIN SERPL BCG-MCNC: 3.7 G/DL (ref 3.2–5.2)
ALP SERPL-CCNC: 128 U/L (ref 46–116)
ALT SERPL W P-5'-P-CCNC: 25 U/L (ref 7–40)
AST SERPL-CCNC: 23 U/L (ref ?–34)
BASOPHILS # BLD AUTO: 0 10^3/UL (ref 0–0.2)
BASOPHILS NFR BLD AUTO: 0.6 % (ref 0–1)
BILIRUB SERPL-MCNC: 0.5 MG/DL (ref 0.3–1.2)
BUN SERPL-MCNC: 17 MG/DL (ref 9–23)
CALCIUM SERPL-MCNC: 8.9 MG/DL (ref 8.5–10.1)
CHLORIDE SERPL-SCNC: 104 MMOL/L (ref 98–107)
CHOLEST SERPL-MCNC: 147 MG/DL (ref ?–200)
CHOLEST/HDLC SERPL: 2.27 {RATIO} (ref ?–5)
CO2 SERPL-SCNC: 30 MMOL/L (ref 20–31)
CREAT SERPL-MCNC: 1.09 MG/DL (ref 0.55–1.3)
EOSINOPHIL # BLD AUTO: 0.1 10^3/UL (ref 0–0.5)
EOSINOPHIL NFR BLD AUTO: 1.7 % (ref 0–3)
EST. AVERAGE GLUCOSE BLD GHB EST-MCNC: 114 MG/DL (ref 60–110)
GFR SERPL CREATININE-BSD FRML MDRD: 56.1 ML/MIN/{1.73_M2} (ref 51–?)
GLUCOSE SERPL-MCNC: 84 MG/DL (ref 60–100)
HCT VFR BLD AUTO: 39.9 % (ref 36–47)
HDLC SERPL-MCNC: 64.7 MG/DL (ref 40–?)
HGB BLD-MCNC: 13.1 G/DL (ref 12–15.5)
LDLC SERPL CALC-MCNC: 68.5 MG/DL (ref ?–100)
LYMPHOCYTES # BLD AUTO: 1.7 10^3/UL (ref 1.5–5)
LYMPHOCYTES NFR BLD AUTO: 25.4 % (ref 24–44)
MCH RBC QN AUTO: 29.5 PG (ref 27–33)
MCHC RBC AUTO-ENTMCNC: 32.8 G/DL (ref 32–36.5)
MCV RBC AUTO: 89.9 FL (ref 80–96)
MONOCYTES # BLD AUTO: 0.6 10^3/UL (ref 0–0.8)
MONOCYTES NFR BLD AUTO: 8.4 % (ref 2–8)
NEUTROPHILS # BLD AUTO: 4.2 10^3/UL (ref 1.5–8.5)
NEUTROPHILS NFR BLD AUTO: 63.6 % (ref 36–66)
NONHDLC SERPL-MCNC: 82.3 MG/DL
PLATELET # BLD AUTO: 300 10^3/UL (ref 150–450)
POTASSIUM SERPL-SCNC: 4.3 MMOL/L (ref 3.5–5.1)
PROT SERPL-MCNC: 7 G/DL (ref 5.7–8.2)
RBC # BLD AUTO: 4.44 10^6/UL (ref 4–5.4)
SODIUM SERPL-SCNC: 140 MMOL/L (ref 136–145)
TRIGL SERPL-MCNC: 69 MG/DL (ref ?–150)
WBC # BLD AUTO: 6.6 10^3/UL (ref 4–10)

## 2023-06-08 ENCOUNTER — HOSPITAL ENCOUNTER (OUTPATIENT)
Dept: HOSPITAL 53 - M LAB | Age: 53
End: 2023-06-08
Attending: NURSE PRACTITIONER
Payer: COMMERCIAL

## 2023-06-08 DIAGNOSIS — E11.22: Primary | ICD-10-CM

## 2023-06-08 LAB
ALBUMIN SERPL BCG-MCNC: 3.9 G/DL (ref 3.2–5.2)
ALP SERPL-CCNC: 108 U/L (ref 46–116)
ALT SERPL W P-5'-P-CCNC: 26 U/L (ref 7–40)
AST SERPL-CCNC: 18 U/L (ref ?–34)
BASOPHILS # BLD AUTO: 0 10^3/UL (ref 0–0.2)
BASOPHILS NFR BLD AUTO: 0.4 % (ref 0–1)
BILIRUB SERPL-MCNC: 0.5 MG/DL (ref 0.3–1.2)
BUN SERPL-MCNC: 15 MG/DL (ref 9–23)
CALCIUM SERPL-MCNC: 8.5 MG/DL (ref 8.5–10.1)
CHLORIDE SERPL-SCNC: 107 MMOL/L (ref 98–107)
CO2 SERPL-SCNC: 30 MMOL/L (ref 20–31)
CREAT SERPL-MCNC: 1.25 MG/DL (ref 0.55–1.3)
EOSINOPHIL # BLD AUTO: 0.1 10^3/UL (ref 0–0.5)
EOSINOPHIL NFR BLD AUTO: 1.6 % (ref 0–3)
EST. AVERAGE GLUCOSE BLD GHB EST-MCNC: 114 MG/DL (ref 60–110)
GFR SERPL CREATININE-BSD FRML MDRD: 47.9 ML/MIN/{1.73_M2} (ref 51–?)
GLUCOSE SERPL-MCNC: 87 MG/DL (ref 60–100)
HCT VFR BLD AUTO: 40.2 % (ref 36–47)
HGB BLD-MCNC: 13 G/DL (ref 12–15.5)
LYMPHOCYTES # BLD AUTO: 1.6 10^3/UL (ref 1.5–5)
LYMPHOCYTES NFR BLD AUTO: 22.1 % (ref 24–44)
MCH RBC QN AUTO: 29.2 PG (ref 27–33)
MCHC RBC AUTO-ENTMCNC: 32.3 G/DL (ref 32–36.5)
MCV RBC AUTO: 90.3 FL (ref 80–96)
MONOCYTES # BLD AUTO: 0.7 10^3/UL (ref 0–0.8)
MONOCYTES NFR BLD AUTO: 9.1 % (ref 2–8)
NEUTROPHILS # BLD AUTO: 4.9 10^3/UL (ref 1.5–8.5)
NEUTROPHILS NFR BLD AUTO: 66.5 % (ref 36–66)
PLATELET # BLD AUTO: 257 10^3/UL (ref 150–450)
POTASSIUM SERPL-SCNC: 4.4 MMOL/L (ref 3.5–5.1)
PROT SERPL-MCNC: 6.8 G/DL (ref 5.7–8.2)
RBC # BLD AUTO: 4.45 10^6/UL (ref 4–5.4)
SODIUM SERPL-SCNC: 143 MMOL/L (ref 136–145)
WBC # BLD AUTO: 7.3 10^3/UL (ref 4–10)

## 2023-07-12 ENCOUNTER — HOSPITAL ENCOUNTER (OUTPATIENT)
Dept: HOSPITAL 53 - M LAB REF | Age: 53
End: 2023-07-12
Attending: NURSE PRACTITIONER
Payer: COMMERCIAL

## 2023-07-12 DIAGNOSIS — N89.8: Primary | ICD-10-CM

## 2023-08-12 ENCOUNTER — HOSPITAL ENCOUNTER (OUTPATIENT)
Dept: HOSPITAL 53 - M LAB | Age: 53
End: 2023-08-12
Attending: NURSE PRACTITIONER
Payer: COMMERCIAL

## 2023-08-12 DIAGNOSIS — E11.22: Primary | ICD-10-CM

## 2023-08-12 LAB
ALBUMIN SERPL BCG-MCNC: 3.5 G/DL (ref 3.2–5.2)
ALP SERPL-CCNC: 117 U/L (ref 46–116)
ALT SERPL W P-5'-P-CCNC: 25 U/L (ref 7–40)
AST SERPL-CCNC: 23 U/L (ref ?–34)
BASOPHILS # BLD AUTO: 0 10^3/UL (ref 0–0.2)
BASOPHILS NFR BLD AUTO: 0.3 % (ref 0–1)
BILIRUB SERPL-MCNC: 0.5 MG/DL (ref 0.3–1.2)
BUN SERPL-MCNC: 9 MG/DL (ref 9–23)
CALCIUM SERPL-MCNC: 8.4 MG/DL (ref 8.5–10.1)
CHLORIDE SERPL-SCNC: 110 MMOL/L (ref 98–107)
CO2 SERPL-SCNC: 31 MMOL/L (ref 20–31)
CREAT SERPL-MCNC: 1.06 MG/DL (ref 0.55–1.3)
EOSINOPHIL # BLD AUTO: 0.1 10^3/UL (ref 0–0.5)
EOSINOPHIL NFR BLD AUTO: 1.6 % (ref 0–3)
EST. AVERAGE GLUCOSE BLD GHB EST-MCNC: 108 MG/DL (ref 60–110)
GFR SERPL CREATININE-BSD FRML MDRD: 58 ML/MIN/{1.73_M2} (ref 51–?)
GLUCOSE SERPL-MCNC: 83 MG/DL (ref 60–100)
HCT VFR BLD AUTO: 39.4 % (ref 36–47)
HGB BLD-MCNC: 12.7 G/DL (ref 12–15.5)
LYMPHOCYTES # BLD AUTO: 1.6 10^3/UL (ref 1.5–5)
LYMPHOCYTES NFR BLD AUTO: 25.7 % (ref 24–44)
MCH RBC QN AUTO: 28.5 PG (ref 27–33)
MCHC RBC AUTO-ENTMCNC: 32.2 G/DL (ref 32–36.5)
MCV RBC AUTO: 88.5 FL (ref 80–96)
MONOCYTES # BLD AUTO: 0.6 10^3/UL (ref 0–0.8)
MONOCYTES NFR BLD AUTO: 9.1 % (ref 2–8)
NEUTROPHILS # BLD AUTO: 4 10^3/UL (ref 1.5–8.5)
NEUTROPHILS NFR BLD AUTO: 63.1 % (ref 36–66)
PLATELET # BLD AUTO: 238 10^3/UL (ref 150–450)
POTASSIUM SERPL-SCNC: 4.1 MMOL/L (ref 3.5–5.1)
PROT SERPL-MCNC: 6.4 G/DL (ref 5.7–8.2)
RBC # BLD AUTO: 4.45 10^6/UL (ref 4–5.4)
SODIUM SERPL-SCNC: 147 MMOL/L (ref 136–145)
WBC # BLD AUTO: 6.3 10^3/UL (ref 4–10)

## 2023-09-14 ENCOUNTER — HOSPITAL ENCOUNTER (OUTPATIENT)
Dept: HOSPITAL 53 - M PLAIMG | Age: 53
End: 2023-09-14
Attending: NURSE PRACTITIONER
Payer: COMMERCIAL

## 2023-09-14 DIAGNOSIS — J01.81: Primary | ICD-10-CM

## 2024-02-17 ENCOUNTER — HOSPITAL ENCOUNTER (OUTPATIENT)
Dept: HOSPITAL 53 - M LAB | Age: 54
End: 2024-02-17
Attending: NURSE PRACTITIONER
Payer: COMMERCIAL

## 2024-02-17 DIAGNOSIS — E11.22: Primary | ICD-10-CM

## 2024-02-17 LAB
25(OH)D3 SERPL-MCNC: 47.3 NG/ML (ref 20–100)
ALBUMIN SERPL BCG-MCNC: 3.5 G/DL (ref 3.2–5.2)
ALP SERPL-CCNC: 129 U/L (ref 46–116)
ALT SERPL W P-5'-P-CCNC: 19 U/L (ref 7–40)
AST SERPL-CCNC: 16 U/L (ref ?–34)
BASOPHILS # BLD AUTO: 0 10^3/UL (ref 0–0.2)
BASOPHILS NFR BLD AUTO: 0.4 % (ref 0–1)
BILIRUB SERPL-MCNC: 0.4 MG/DL (ref 0.3–1.2)
BUN SERPL-MCNC: 10 MG/DL (ref 9–23)
CALCIUM SERPL-MCNC: 8.5 MG/DL (ref 8.5–10.1)
CHLORIDE SERPL-SCNC: 108 MMOL/L (ref 98–107)
CHOLEST SERPL-MCNC: 143 MG/DL (ref ?–200)
CHOLEST/HDLC SERPL: 2.27 {RATIO} (ref ?–5)
CO2 SERPL-SCNC: 31 MMOL/L (ref 20–31)
CREAT SERPL-MCNC: 1.03 MG/DL (ref 0.55–1.3)
CREAT UR-MCNC: 74.4 MG/DL
EOSINOPHIL # BLD AUTO: 0.1 10^3/UL (ref 0–0.5)
EOSINOPHIL NFR BLD AUTO: 1.9 % (ref 0–3)
EST. AVERAGE GLUCOSE BLD GHB EST-MCNC: 108 MG/DL (ref 60–110)
GFR SERPL CREATININE-BSD FRML MDRD: 59.7 ML/MIN/{1.73_M2} (ref 51–?)
GLUCOSE SERPL-MCNC: 84 MG/DL (ref 60–100)
HCT VFR BLD AUTO: 41.7 % (ref 36–47)
HDLC SERPL-MCNC: 62.9 MG/DL (ref 40–?)
HGB BLD-MCNC: 13.6 G/DL (ref 12–15.5)
LDLC SERPL CALC-MCNC: 68.9 MG/DL (ref ?–100)
LYMPHOCYTES # BLD AUTO: 1.8 10^3/UL (ref 1.5–5)
LYMPHOCYTES NFR BLD AUTO: 25.7 % (ref 24–44)
MCH RBC QN AUTO: 28.7 PG (ref 27–33)
MCHC RBC AUTO-ENTMCNC: 32.6 G/DL (ref 32–36.5)
MCV RBC AUTO: 88 FL (ref 80–96)
MICROALBUMIN UR-MCNC: < 3 MG/L
MICROALBUMIN/CREAT UR: 4 MCG/MG (ref 0–30)
MONOCYTES # BLD AUTO: 0.6 10^3/UL (ref 0–0.8)
MONOCYTES NFR BLD AUTO: 8.6 % (ref 2–8)
NEUTROPHILS # BLD AUTO: 4.4 10^3/UL (ref 1.5–8.5)
NEUTROPHILS NFR BLD AUTO: 63.1 % (ref 36–66)
NONHDLC SERPL-MCNC: 80.1 MG/DL
PLATELET # BLD AUTO: 250 10^3/UL (ref 150–450)
POTASSIUM SERPL-SCNC: 4 MMOL/L (ref 3.5–5.1)
PROT SERPL-MCNC: 6.6 G/DL (ref 5.7–8.2)
RBC # BLD AUTO: 4.74 10^6/UL (ref 4–5.4)
SODIUM SERPL-SCNC: 143 MMOL/L (ref 136–145)
T4 FREE SERPL-MCNC: 0.97 NG/DL (ref 0.89–1.76)
TRIGL SERPL-MCNC: 56 MG/DL (ref ?–150)
TSH SERPL DL<=0.005 MIU/L-ACNC: 2.02 UIU/ML (ref 0.55–4.78)
WBC # BLD AUTO: 6.9 10^3/UL (ref 4–10)

## 2024-03-06 ENCOUNTER — HOSPITAL ENCOUNTER (OUTPATIENT)
Dept: HOSPITAL 53 - M PLAIMG | Age: 54
End: 2024-03-06
Attending: NURSE PRACTITIONER
Payer: COMMERCIAL

## 2024-03-06 DIAGNOSIS — M25.561: Primary | ICD-10-CM

## 2024-05-18 ENCOUNTER — HOSPITAL ENCOUNTER (OUTPATIENT)
Dept: HOSPITAL 53 - M LAB | Age: 54
End: 2024-05-18
Attending: NURSE PRACTITIONER
Payer: COMMERCIAL

## 2024-05-18 DIAGNOSIS — E11.22: Primary | ICD-10-CM

## 2024-05-18 LAB
ALBUMIN SERPL BCG-MCNC: 3.8 G/DL (ref 3.2–5.2)
ALP SERPL-CCNC: 133 U/L (ref 46–116)
ALT SERPL W P-5'-P-CCNC: 24 U/L (ref 7–40)
AST SERPL-CCNC: 15 U/L (ref ?–34)
BASOPHILS # BLD AUTO: 0 10^3/UL (ref 0–0.2)
BASOPHILS NFR BLD AUTO: 0.5 % (ref 0–1)
BILIRUB SERPL-MCNC: 0.6 MG/DL (ref 0.3–1.2)
BUN SERPL-MCNC: 10 MG/DL (ref 9–23)
CALCIUM SERPL-MCNC: 8.8 MG/DL (ref 8.5–10.1)
CHLORIDE SERPL-SCNC: 109 MMOL/L (ref 98–107)
CO2 SERPL-SCNC: 30 MMOL/L (ref 20–31)
CREAT SERPL-MCNC: 1.18 MG/DL (ref 0.55–1.3)
CRP SERPL-MCNC: 0.8 MG/DL (ref ?–1)
EOSINOPHIL # BLD AUTO: 0.2 10^3/UL (ref 0–0.5)
EOSINOPHIL NFR BLD AUTO: 2.1 % (ref 0–3)
ERYTHROCYTE [SEDIMENTATION RATE] IN BLOOD BY WESTERGREN METHOD: 45 MM/HR (ref 0–30)
EST. AVERAGE GLUCOSE BLD GHB EST-MCNC: 111 MG/DL (ref 60–110)
GFR SERPL CREATININE-BSD FRML MDRD: 51 ML/MIN/{1.73_M2} (ref 51–?)
GLUCOSE SERPL-MCNC: 87 MG/DL (ref 60–100)
HCT VFR BLD AUTO: 42 % (ref 36–47)
HGB BLD-MCNC: 13.7 G/DL (ref 12–15.5)
LYMPHOCYTES # BLD AUTO: 1.7 10^3/UL (ref 1.5–5)
LYMPHOCYTES NFR BLD AUTO: 23.5 % (ref 24–44)
MCH RBC QN AUTO: 28.6 PG (ref 27–33)
MCHC RBC AUTO-ENTMCNC: 32.6 G/DL (ref 32–36.5)
MCV RBC AUTO: 87.7 FL (ref 80–96)
MONOCYTES # BLD AUTO: 0.5 10^3/UL (ref 0–0.8)
MONOCYTES NFR BLD AUTO: 7.3 % (ref 2–8)
NEUTROPHILS # BLD AUTO: 4.9 10^3/UL (ref 1.5–8.5)
NEUTROPHILS NFR BLD AUTO: 66.3 % (ref 36–66)
PLATELET # BLD AUTO: 248 10^3/UL (ref 150–450)
POTASSIUM SERPL-SCNC: 4.4 MMOL/L (ref 3.5–5.1)
PROT SERPL-MCNC: 7 G/DL (ref 5.7–8.2)
RBC # BLD AUTO: 4.79 10^6/UL (ref 4–5.4)
RHEUMATOID FACT SERPL-ACNC: 10.9 IU/ML (ref ?–14)
SODIUM SERPL-SCNC: 145 MMOL/L (ref 136–145)
URATE SERPL-MCNC: 4.5 MG/DL (ref 3.1–7.8)
WBC # BLD AUTO: 7.3 10^3/UL (ref 4–10)

## 2024-06-08 ENCOUNTER — HOSPITAL ENCOUNTER (OUTPATIENT)
Dept: HOSPITAL 53 - M RAD | Age: 54
End: 2024-06-08
Attending: FAMILY MEDICINE
Payer: COMMERCIAL

## 2024-06-08 DIAGNOSIS — M54.16: Primary | ICD-10-CM

## 2024-07-03 ENCOUNTER — HOSPITAL ENCOUNTER (OUTPATIENT)
Dept: HOSPITAL 53 - M LAB | Age: 54
End: 2024-07-03
Attending: PHYSICIAN ASSISTANT
Payer: COMMERCIAL

## 2024-07-03 DIAGNOSIS — L40.9: Primary | ICD-10-CM

## 2024-07-03 LAB
ALBUMIN SERPL BCG-MCNC: 4.1 G/DL (ref 3.2–5.2)
ALP SERPL-CCNC: 128 U/L (ref 46–116)
ALT SERPL W P-5'-P-CCNC: 21 U/L (ref 7–40)
AST SERPL-CCNC: 16 U/L (ref ?–34)
BASOPHILS # BLD AUTO: 0 10^3/UL (ref 0–0.2)
BASOPHILS NFR BLD AUTO: 0.6 % (ref 0–1)
BILIRUB SERPL-MCNC: 0.5 MG/DL (ref 0.3–1.2)
BUN SERPL-MCNC: 9 MG/DL (ref 9–23)
CALCIUM SERPL-MCNC: 8.9 MG/DL (ref 8.5–10.1)
CHLORIDE SERPL-SCNC: 108 MMOL/L (ref 98–107)
CO2 SERPL-SCNC: 28 MMOL/L (ref 20–31)
CREAT SERPL-MCNC: 1.13 MG/DL (ref 0.55–1.3)
CRP SERPL-MCNC: 1.2 MG/DL (ref ?–1)
EOSINOPHIL # BLD AUTO: 0.1 10^3/UL (ref 0–0.5)
EOSINOPHIL NFR BLD AUTO: 1.9 % (ref 0–3)
ERYTHROCYTE [SEDIMENTATION RATE] IN BLOOD BY WESTERGREN METHOD: 37 MM/HR (ref 0–30)
GFR SERPL CREATININE-BSD FRML MDRD: 53.6 ML/MIN/{1.73_M2} (ref 51–?)
GLUCOSE SERPL-MCNC: 85 MG/DL (ref 60–100)
HCT VFR BLD AUTO: 42.2 % (ref 36–47)
HGB BLD-MCNC: 13.8 G/DL (ref 12–15.5)
LYMPHOCYTES # BLD AUTO: 2 10^3/UL (ref 1.5–5)
LYMPHOCYTES NFR BLD AUTO: 29.1 % (ref 24–44)
MCH RBC QN AUTO: 28.9 PG (ref 27–33)
MCHC RBC AUTO-ENTMCNC: 32.7 G/DL (ref 32–36.5)
MCV RBC AUTO: 88.5 FL (ref 80–96)
MONOCYTES # BLD AUTO: 0.6 10^3/UL (ref 0–0.8)
MONOCYTES NFR BLD AUTO: 8.6 % (ref 2–8)
NEUTROPHILS # BLD AUTO: 4.1 10^3/UL (ref 1.5–8.5)
NEUTROPHILS NFR BLD AUTO: 59.7 % (ref 36–66)
PLATELET # BLD AUTO: 269 10^3/UL (ref 150–450)
POTASSIUM SERPL-SCNC: 4.3 MMOL/L (ref 3.5–5.1)
PROT SERPL-MCNC: 7.1 G/DL (ref 5.7–8.2)
RBC # BLD AUTO: 4.77 10^6/UL (ref 4–5.4)
RHEUMATOID FACT SERPL-ACNC: 5.4 IU/ML (ref ?–14)
SODIUM SERPL-SCNC: 143 MMOL/L (ref 136–145)
WBC # BLD AUTO: 6.8 10^3/UL (ref 4–10)

## 2024-07-05 LAB — ANA SER QL IF: NEGATIVE

## 2024-07-06 LAB
ENA SM AB SER IA-ACNC: (no result) AI
ENA SM+RNP AB SER IA-ACNC: (no result) AI

## 2024-08-23 ENCOUNTER — HOSPITAL ENCOUNTER (OUTPATIENT)
Dept: HOSPITAL 53 - M LAB | Age: 54
End: 2024-08-23
Attending: NURSE PRACTITIONER
Payer: COMMERCIAL

## 2024-08-23 DIAGNOSIS — E11.22: Primary | ICD-10-CM

## 2024-08-23 LAB
ALBUMIN SERPL BCG-MCNC: 4 G/DL (ref 3.2–5.2)
ALP SERPL-CCNC: 112 U/L (ref 46–116)
ALT SERPL W P-5'-P-CCNC: 17 U/L (ref 7–40)
AST SERPL-CCNC: 16 U/L (ref ?–34)
BASOPHILS # BLD AUTO: 0.1 10^3/UL (ref 0–0.2)
BASOPHILS NFR BLD AUTO: 0.8 % (ref 0–1)
BILIRUB SERPL-MCNC: 0.5 MG/DL (ref 0.3–1.2)
BUN SERPL-MCNC: 13 MG/DL (ref 9–23)
CALCIUM SERPL-MCNC: 9.4 MG/DL (ref 8.5–10.1)
CHLORIDE SERPL-SCNC: 111 MMOL/L (ref 98–107)
CHOLEST SERPL-MCNC: 137 MG/DL (ref ?–200)
CHOLEST/HDLC SERPL: 2.59 {RATIO} (ref ?–5)
CO2 SERPL-SCNC: 29 MMOL/L (ref 20–31)
CREAT SERPL-MCNC: 1.27 MG/DL (ref 0.55–1.3)
EOSINOPHIL # BLD AUTO: 0.1 10^3/UL (ref 0–0.5)
EOSINOPHIL NFR BLD AUTO: 2 % (ref 0–3)
EST. AVERAGE GLUCOSE BLD GHB EST-MCNC: 108 MG/DL (ref 60–110)
GFR SERPL CREATININE-BSD FRML MDRD: 46.9 ML/MIN/{1.73_M2} (ref 51–?)
GLUCOSE SERPL-MCNC: 92 MG/DL (ref 60–100)
HCT VFR BLD AUTO: 43.4 % (ref 36–47)
HDLC SERPL-MCNC: 52.7 MG/DL (ref 40–?)
HGB BLD-MCNC: 14 G/DL (ref 12–15.5)
LDLC SERPL CALC-MCNC: 68.3 MG/DL (ref ?–100)
LYMPHOCYTES # BLD AUTO: 1.7 10^3/UL (ref 1.5–5)
LYMPHOCYTES NFR BLD AUTO: 25 % (ref 24–44)
MCH RBC QN AUTO: 28.6 PG (ref 27–33)
MCHC RBC AUTO-ENTMCNC: 32.3 G/DL (ref 32–36.5)
MCV RBC AUTO: 88.6 FL (ref 80–96)
MONOCYTES # BLD AUTO: 0.6 10^3/UL (ref 0–0.8)
MONOCYTES NFR BLD AUTO: 9.4 % (ref 2–8)
NEUTROPHILS # BLD AUTO: 4.1 10^3/UL (ref 1.5–8.5)
NEUTROPHILS NFR BLD AUTO: 62.5 % (ref 36–66)
NONHDLC SERPL-MCNC: 84.3 MG/DL
PLATELET # BLD AUTO: 264 10^3/UL (ref 150–450)
POTASSIUM SERPL-SCNC: 4.6 MMOL/L (ref 3.5–5.1)
PROT SERPL-MCNC: 7.3 G/DL (ref 5.7–8.2)
RBC # BLD AUTO: 4.9 10^6/UL (ref 4–5.4)
SODIUM SERPL-SCNC: 143 MMOL/L (ref 136–145)
TRIGL SERPL-MCNC: 80 MG/DL (ref ?–150)
WBC # BLD AUTO: 6.6 10^3/UL (ref 4–10)

## 2024-11-09 ENCOUNTER — HOSPITAL ENCOUNTER (OUTPATIENT)
Dept: HOSPITAL 53 - M RAD | Age: 54
End: 2024-11-09
Attending: CHIROPRACTOR
Payer: COMMERCIAL

## 2024-11-09 DIAGNOSIS — M54.59: Primary | ICD-10-CM

## 2024-11-26 ENCOUNTER — HOSPITAL ENCOUNTER (OUTPATIENT)
Dept: HOSPITAL 53 - M RAD | Age: 54
End: 2024-11-26
Attending: NURSE PRACTITIONER
Payer: COMMERCIAL

## 2024-11-26 DIAGNOSIS — N18.32: Primary | ICD-10-CM

## 2025-02-23 ENCOUNTER — HOSPITAL ENCOUNTER (OUTPATIENT)
Dept: HOSPITAL 53 - M LAB | Age: 55
End: 2025-02-23
Attending: NURSE PRACTITIONER
Payer: COMMERCIAL

## 2025-02-23 DIAGNOSIS — E11.22: Primary | ICD-10-CM

## 2025-02-23 LAB
ALBUMIN SERPL BCG-MCNC: 3.8 G/DL (ref 3.2–5.2)
ALP SERPL-CCNC: 94 U/L (ref 35–104)
ALT SERPL W P-5'-P-CCNC: 14 U/L (ref 7–40)
AST SERPL-CCNC: 15 U/L (ref ?–34)
BASOPHILS # BLD AUTO: 0 10^3/UL (ref 0–0.2)
BASOPHILS NFR BLD AUTO: 0.7 % (ref 0–1)
BILIRUB SERPL-MCNC: 0.6 MG/DL (ref 0.3–1.2)
BUN SERPL-MCNC: 9 MG/DL (ref 9–23)
CALCIUM SERPL-MCNC: 9.3 MG/DL (ref 8.5–10.1)
CHLORIDE SERPL-SCNC: 110 MMOL/L (ref 98–107)
CHOLEST SERPL-MCNC: 150 MG/DL (ref ?–200)
CHOLEST/HDLC SERPL: 2.41 {RATIO} (ref ?–5)
CO2 SERPL-SCNC: 28 MMOL/L (ref 20–31)
CREAT SERPL-MCNC: 1.07 MG/DL (ref 0.55–1.3)
CREAT UR-MCNC: 209.1 MG/DL
EOSINOPHIL # BLD AUTO: 0.1 10^3/UL (ref 0–0.5)
EOSINOPHIL NFR BLD AUTO: 2 % (ref 0–3)
EST. AVERAGE GLUCOSE BLD GHB EST-MCNC: 103 MG/DL (ref 60–110)
GFR SERPL CREATININE-BSD FRML MDRD: 56.9 ML/MIN/{1.73_M2} (ref 51–?)
GLUCOSE SERPL-MCNC: 88 MG/DL (ref 60–100)
HCT VFR BLD AUTO: 43 % (ref 36–47)
HDLC SERPL-MCNC: 62 MG/DL (ref 40–?)
HGB BLD-MCNC: 14.3 G/DL (ref 12–15.5)
LDLC SERPL CALC-MCNC: 72.4 MG/DL (ref ?–100)
LYMPHOCYTES # BLD AUTO: 1.7 10^3/UL (ref 1.5–5)
LYMPHOCYTES NFR BLD AUTO: 27.8 % (ref 24–44)
MCH RBC QN AUTO: 29.9 PG (ref 27–33)
MCHC RBC AUTO-ENTMCNC: 33.3 G/DL (ref 32–36.5)
MCV RBC AUTO: 90 FL (ref 80–96)
MICROALBUMIN UR-MCNC: 18 MG/L
MICROALBUMIN/CREAT UR: 8.6 MCG/MG (ref 0–30)
MONOCYTES # BLD AUTO: 0.5 10^3/UL (ref 0–0.8)
MONOCYTES NFR BLD AUTO: 8.5 % (ref 2–8)
NEUTROPHILS # BLD AUTO: 3.8 10^3/UL (ref 1.5–8.5)
NEUTROPHILS NFR BLD AUTO: 60.8 % (ref 36–66)
NONHDLC SERPL-MCNC: 88 MG/DL
PLATELET # BLD AUTO: 253 10^3/UL (ref 150–450)
POTASSIUM SERPL-SCNC: 4.1 MMOL/L (ref 3.5–5.1)
PROT SERPL-MCNC: 7.1 G/DL (ref 5.7–8.2)
RBC # BLD AUTO: 4.78 10^6/UL (ref 4–5.4)
SODIUM SERPL-SCNC: 147 MMOL/L (ref 136–145)
T4 FREE SERPL-MCNC: 1.34 NG/DL (ref 0.89–1.76)
TRIGL SERPL-MCNC: 78 MG/DL (ref ?–150)
TSH SERPL DL<=0.005 MIU/L-ACNC: 1.34 UIU/ML (ref 0.55–4.78)
WBC # BLD AUTO: 6.2 10^3/UL (ref 4–10)

## 2025-04-14 ENCOUNTER — HOSPITAL ENCOUNTER (OUTPATIENT)
Dept: HOSPITAL 53 - M OPP | Age: 55
Discharge: HOME | End: 2025-04-14
Attending: SURGERY
Payer: COMMERCIAL

## 2025-04-14 VITALS — OXYGEN SATURATION: 100 % | SYSTOLIC BLOOD PRESSURE: 111 MMHG | DIASTOLIC BLOOD PRESSURE: 77 MMHG

## 2025-04-14 VITALS — BODY MASS INDEX: 25.55 KG/M2 | HEIGHT: 66 IN | WEIGHT: 159 LBS

## 2025-04-14 VITALS — TEMPERATURE: 97.1 F

## 2025-04-14 DIAGNOSIS — Z12.11: Primary | ICD-10-CM

## 2025-04-14 DIAGNOSIS — F41.9: ICD-10-CM

## 2025-04-14 DIAGNOSIS — E78.00: ICD-10-CM

## 2025-04-14 DIAGNOSIS — Z79.899: ICD-10-CM

## 2025-04-14 DIAGNOSIS — Z79.85: ICD-10-CM

## 2025-04-14 DIAGNOSIS — E11.9: ICD-10-CM

## 2025-04-14 DIAGNOSIS — G47.30: ICD-10-CM

## 2025-04-14 DIAGNOSIS — K21.9: ICD-10-CM

## 2025-04-14 DIAGNOSIS — Z88.1: ICD-10-CM

## 2025-04-14 DIAGNOSIS — I10: ICD-10-CM

## 2025-04-14 DIAGNOSIS — Z79.84: ICD-10-CM

## 2025-04-14 DIAGNOSIS — D12.5: ICD-10-CM

## 2025-04-14 DIAGNOSIS — K64.1: ICD-10-CM

## 2025-05-16 ENCOUNTER — HOSPITAL ENCOUNTER (OUTPATIENT)
Dept: HOSPITAL 53 - M PLALAB | Age: 55
End: 2025-05-16
Attending: NURSE PRACTITIONER
Payer: COMMERCIAL

## 2025-05-16 DIAGNOSIS — E11.22: Primary | ICD-10-CM

## 2025-05-16 LAB
ALBUMIN SERPL BCG-MCNC: 4.1 G/DL (ref 3.2–5.2)
BASOPHILS NFR BLD AUTO: 0.6 % (ref 0–1)
BILIRUB SERPL-MCNC: 0.5 MG/DL (ref 0.3–1.2)
CALCIUM SERPL-MCNC: 9.2 MG/DL (ref 8.5–10.1)
CREAT SERPL-MCNC: 1.13 MG/DL (ref 0.55–1.3)
EOSINOPHIL # BLD AUTO: 0.1 10^3/UL (ref 0–0.5)
EOSINOPHIL NFR BLD AUTO: 1.3 % (ref 0–3)
GFR SERPL CREATININE-BSD FRML MDRD: 57.8 ML/MIN/{1.73_M2} (ref 51–?)
HCT VFR BLD AUTO: 42.8 % (ref 36–47)
HGB BLD-MCNC: 14.1 G/DL (ref 12–15.5)
LYMPHOCYTES # BLD AUTO: 2.1 10^3/UL (ref 1.5–5)
LYMPHOCYTES NFR BLD AUTO: 29.4 % (ref 24–44)
MCH RBC QN AUTO: 29.9 PG (ref 27–33)
MCHC RBC AUTO-ENTMCNC: 32.9 G/DL (ref 32–36.5)
MCV RBC AUTO: 90.9 FL (ref 80–96)
MONOCYTES # BLD AUTO: 0.6 10^3/UL (ref 0–0.8)
MONOCYTES NFR BLD AUTO: 8.3 % (ref 2–8)
NEUTROPHILS # BLD AUTO: 4.2 10^3/UL (ref 1.5–8.5)
NEUTROPHILS NFR BLD AUTO: 60.3 % (ref 36–66)
PLATELET # BLD AUTO: 253 10^3/UL (ref 150–450)
POTASSIUM SERPL-SCNC: 4.3 MMOL/L (ref 3.5–5.1)
PROT SERPL-MCNC: 7.3 G/DL (ref 5.7–8.2)
RBC # BLD AUTO: 4.71 10^6/UL (ref 4–5.4)